# Patient Record
Sex: MALE | Race: WHITE | NOT HISPANIC OR LATINO | Employment: FULL TIME | URBAN - METROPOLITAN AREA
[De-identification: names, ages, dates, MRNs, and addresses within clinical notes are randomized per-mention and may not be internally consistent; named-entity substitution may affect disease eponyms.]

---

## 2023-08-30 PROBLEM — K21.9 GASTROESOPHAGEAL REFLUX DISEASE: Status: ACTIVE | Noted: 2023-08-30

## 2023-08-30 PROBLEM — R30.0 DYSURIA: Status: ACTIVE | Noted: 2023-08-30

## 2023-08-30 PROBLEM — H90.42 SENSORINEURAL HEARING LOSS, UNILATERAL, LEFT EAR, WITH UNRESTRICTED HEARING ON THE CONTRALATERAL SIDE: Status: ACTIVE | Noted: 2023-08-30

## 2023-08-30 PROBLEM — H69.90 EUSTACHIAN TUBE DYSFUNCTION: Status: ACTIVE | Noted: 2023-08-30

## 2023-08-30 PROBLEM — H93.8X2 SENSATION OF FULLNESS IN LEFT EAR: Status: ACTIVE | Noted: 2023-08-30

## 2023-08-30 PROBLEM — H81.02 COCHLEAR HYDROPS OF LEFT EAR: Status: ACTIVE | Noted: 2023-08-30

## 2023-10-06 ENCOUNTER — CLINICAL SUPPORT (OUTPATIENT)
Dept: AUDIOLOGY | Facility: CLINIC | Age: 43
End: 2023-10-06
Payer: COMMERCIAL

## 2023-10-06 ENCOUNTER — OFFICE VISIT (OUTPATIENT)
Dept: OTOLARYNGOLOGY | Facility: CLINIC | Age: 43
End: 2023-10-06
Payer: COMMERCIAL

## 2023-10-06 VITALS — TEMPERATURE: 97.8 F | WEIGHT: 151 LBS | HEIGHT: 66 IN | BODY MASS INDEX: 24.27 KG/M2

## 2023-10-06 DIAGNOSIS — H93.8X2 SENSATION OF FULLNESS IN LEFT EAR: ICD-10-CM

## 2023-10-06 DIAGNOSIS — H81.02 MENIERE DISEASE, LEFT: ICD-10-CM

## 2023-10-06 DIAGNOSIS — H90.42 SENSORINEURAL HEARING LOSS, UNILATERAL, LEFT EAR, WITH UNRESTRICTED HEARING ON THE CONTRALATERAL SIDE: ICD-10-CM

## 2023-10-06 DIAGNOSIS — H90.42 SENSORINEURAL HEARING LOSS, UNILATERAL, LEFT EAR, WITH UNRESTRICTED HEARING ON THE CONTRALATERAL SIDE: Primary | ICD-10-CM

## 2023-10-06 DIAGNOSIS — H81.02 COCHLEAR HYDROPS OF LEFT EAR: Primary | ICD-10-CM

## 2023-10-06 PROCEDURE — 1036F TOBACCO NON-USER: CPT | Performed by: OTOLARYNGOLOGY

## 2023-10-06 PROCEDURE — 92550 TYMPANOMETRY & REFLEX THRESH: CPT

## 2023-10-06 PROCEDURE — 99214 OFFICE O/P EST MOD 30 MIN: CPT | Performed by: OTOLARYNGOLOGY

## 2023-10-06 PROCEDURE — 92557 COMPREHENSIVE HEARING TEST: CPT

## 2023-10-06 ASSESSMENT — PAIN SCALES - GENERAL: PAINLEVEL_OUTOF10: 0 - NO PAIN

## 2023-10-06 NOTE — PROGRESS NOTES
AUDIOLOGIC EVALUATION  Name: Alec Swan  YOB: 1980  Age: 43 y.o.    Date of Evaluation:  10/6/2023    History:   Alec Swan, age 43 years, was seen for an add-on hearing evaluation during a conjunction visit with James Luna MD. He reported a sudden hearing loss in the left ear approximately 20 years ago. He noted a history of aural fullness and tinnitus. He indicated that he experiences episodic vertigo. His most recent episode was 1.5 weeks prior to today's appointment and lasted for approximately 1 hour.    Previous audiologic evaluation on 10/12/2020 from an outside clinic revealed normal hearing in the right ear and a moderately-severe rising  mild sensorineural hearing loss in the left ear.    Evaluation:    Otoscopy  Clear ear canals bilaterally.    Tympanometry  Right ear:Type A tympanogram, normal ear canal volume and compliance  Left ear: Type A tympanogram, normal ear canal volume and compliance     Acoustic Reflexes  Right ear: Ipsilateral acoustic reflexes present at 500 Hz - 2000 Hz (no response at 4000 Hz). Contralateral (probe right, stimulus left) acoustic reflexes present at 500 Hz - 1000 Hz (no response at 2000 - 4000 Hz). Contralateral acoustic reflex decay could not be tested due to equipment limits.   Left ear: Ipsilateral acoustic reflexes present at 500 Hz - 1000 Hz (no response at 2000 - 4000 Hz). Contralateral (probe left, stimulus right) acoustic reflexes were absent 500 Hz - 4000 Hz. Contralateral acoustic reflex decay could not be tested due to equipment limits.     Audiometric Evaluation  Right ear: hearing sensitivity within normal limits. Word recognition ability estimated to be excellent (96%) at 45 dB HL based on an NU-6 recorded 25-word list.  Left ear: moderate rising to mild sensorineural hearing loss. Word recognition ability estimated to be good (84%) at 75 dB HL based on an NU-6 recorded 25-word list.    The test results were discussed with the patient and  they were returned to James Luna MD for completion of the office visit.    Impressions  Today's evaluation revealed normal hearing in the right ear and a moderate rising to mild sensorineural hearing loss in the left ear. Word recognition abilities were measured to be excellent in the right ear and good in the left ear. Tympanograms were consistent with normal middle ear function.    Recommendations  1) Continue medical follow-up with James Luna MD   2) Consider a hearing aid in the left ear.    Time: 9476-0278    TRACI Villegas, CCC-A  Licensed Audiologist

## 2023-10-06 NOTE — PROGRESS NOTES
History of present illness:  Alec Swan is a 43 y.o. male presenting today for a follow-up visit.  At his last appointment on February 3, 2023 he presented with left ear fullness and was diagnosed with probable cochlear hydrops and was started on betahistine.  He has since weaned off the betahistine but has had 2 episodes of vertiginous dizziness the last 1 being a week and a half ago lasting over an hour.  This was associated with muffled and fluctuating hearing loss in the left ear.  He is here today visit his treatment options.  He had questions about hearing amplification and medications.    The patient's current medications, active allergies and list of medical problems were reviewed in the EHR and confirmed electronically there are as follows;  Allergies:   No Known Allergies  Current list of medications:   No current outpatient medications on file.     No current facility-administered medications for this visit.     Problem list:  Patient Active Problem List   Diagnosis    Cochlear hydrops of left ear    Dysuria    Eustachian tube dysfunction    Gastroesophageal reflux disease    Inflamed seborrheic keratosis    Other specified dermatitis    Sensation of fullness in left ear    Sensorineural hearing loss, unilateral, left ear, with unrestricted hearing on the contralateral side         Physical Examination:  CONSTITUTIONAL:  No acute distress  VOICE:  No hoarseness or other abnormality  RESPIRATION:  Breathing comfortably, no stridor  CV:  No clubbing/cyanosis/edema in hands  EYES:  EOM intact, sclera clear  NEURO:  Alert and oriented times 3, Cranial nerves II-XII grossly intact and symmetric bilaterally  HEAD AND FACE:  Symmetric facial features, no masses or lesions  RIGHT EAR:  Normal external ear and post auricular area, no visible lesions, external auditory canal patent, tympanic membrane intact, no retraction, no signs of mass, effusion, or infection within the middle ear  LEFT EAR: Normal external  ear and post auricular area, no visible lesions, external auditory canal patent, tympanic membrane intact, no retraction, no signs of mass, effusion, or infection within the middle ear  NOSE:  Anterior rhinoscopy clear, no significant external deformity.  ORAL CAVITY/OROPHARYNX/LIPS:  normal lining, mobile tongue.  PHARYNGEAL WALLS: Moist mucosal lining, good palatal elevation  NECK/LYMPH:  No LAD, no thyroid masses, trachea midline  SKIN:  Neck and facial skin is without scar or injury  PSYCH:  Alert and oriented with appropriate mood and affect    Diagnostic testing:    Audiometry:  Today's audiogram completed showed on the right normal hearing sensitivity with excellent speech discrimination on the left he had a moderate to mild sensorineural hearing loss speech discrimination was 84%.  Type a tympanogram.    I personally reviewed the available patient's external record and independently reviewed their audiometric testing [and] radiographic imaging through the appropriate viewing software as detailed in my note and agree with the detailed report.    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  He may have probable Ménière's disease in the left ear.  We reviewed the dietary changes with low-sodium diet, lifestyle modification and taking betahistine 16 mg 3 times daily.  He would like to think about the possibility of hearing amplification.  He will return to see me in 6 to 8 months for follow-up    I discussed with the patient the complexity of my medical decision making including the treatment and testing rational, indications of their elective procedure and possible adverse effects and/or complications. Based on the provided documentation and my professional assessment of this patient's chronic condition with acute  exacerbation, the complexity of evaluation and treatment is moderate.    This note was created using speech recognition transcription software. Despite proofreading, several typographical errors might be present that might affect the meaning of the content. Please call with any questions.

## 2024-06-04 ENCOUNTER — HOSPITAL ENCOUNTER (OUTPATIENT)
Dept: RADIOLOGY | Facility: CLINIC | Age: 44
Discharge: HOME | End: 2024-06-04
Payer: COMMERCIAL

## 2024-06-04 ENCOUNTER — OFFICE VISIT (OUTPATIENT)
Dept: ORTHOPEDIC SURGERY | Facility: CLINIC | Age: 44
End: 2024-06-04
Payer: COMMERCIAL

## 2024-06-04 DIAGNOSIS — S62.101A FX WRIST, RIGHT, CLOSED, INITIAL ENCOUNTER: ICD-10-CM

## 2024-06-04 DIAGNOSIS — S52.501A NONDISPLACED FRACTURE OF DISTAL END OF RIGHT RADIUS: Primary | ICD-10-CM

## 2024-06-04 DIAGNOSIS — M25.531 WRIST PAIN, RIGHT: ICD-10-CM

## 2024-06-04 PROCEDURE — 99204 OFFICE O/P NEW MOD 45 MIN: CPT | Performed by: FAMILY MEDICINE

## 2024-06-04 PROCEDURE — 1036F TOBACCO NON-USER: CPT | Performed by: FAMILY MEDICINE

## 2024-06-04 PROCEDURE — L3984 UPPER EXT FX ORTHOSIS WRIST: HCPCS | Performed by: FAMILY MEDICINE

## 2024-06-04 PROCEDURE — 73110 X-RAY EXAM OF WRIST: CPT | Mod: RT

## 2024-06-04 PROCEDURE — 73110 X-RAY EXAM OF WRIST: CPT | Mod: RIGHT SIDE | Performed by: RADIOLOGY

## 2024-06-04 ASSESSMENT — PAIN DESCRIPTION - DESCRIPTORS: DESCRIPTORS: ACHING;SHARP

## 2024-06-04 ASSESSMENT — PAIN - FUNCTIONAL ASSESSMENT: PAIN_FUNCTIONAL_ASSESSMENT: 0-10

## 2024-06-04 ASSESSMENT — PAIN SCALES - GENERAL: PAINLEVEL_OUTOF10: 5 - MODERATE PAIN

## 2024-06-04 NOTE — PROGRESS NOTES
NPV R wrist fx    History of Present Illness:  Encounter date: 06/04/2024  Alec Swan is a 44 y.o. 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 He injured his right wrist on 6/1/2024, was loading his tractor onto his truck, then the tractor began to fall, he jumped out of the way, and landed onto outstretched right hand. He had immediate pain and subsequent swelling, then went to  that day where he had XR's which revealed nondisplaced articular distal radius fracture. He was placed in a cock-up wrist brace and advised to follow-up with ortho. He has been wearing the brace since then and states his swelling has gone down with some improvement of pain, though ROM is still very limited in the wrist and still has tenderness. Denies any weakness, numbness, tingling, discoloration, or radiation of pain. Denies any previous injury to his right wrist/hand. Denies any other concerns at this time.    Review of Systems  Constitutional: no fever, no chills, not feeling tired, no recent weight gain and no recent weight loss.   ENT: no nosebleeds.   Cardiovascular: no chest pain.   Respiratory: no shortness of breath and no cough.   Gastrointestinal: no abdominal pain, no nausea, no diarrhea and no vomiting.   Musculoskeletal: as noted in HPI and no arthralgias.   Integumentary: no rashes and no skin wound.   Neurological: no headache.   Psychiatric: no sleep disturbances and no depression.   Endocrine: no muscle weakness and no muscle cramps.   Hematologic/Lymphatic: no swollen glands and no tendency for easy bruising.     Physical Exam:  Examination of the right wrist and hand: Wrist range of motion is limited and painful. There is soft tissue swelling over dorsal aspect with mild ecchymosis. No thenar or hypothenar atrophy. There is tenderness with palpation of the distal radius. There is no tenderness with palpation of the TFCC region, ECU tendon, 1st CMC joint or  STT joint. No tenderness with palpation of the scaphoid.  strength slightly reduced due to pain/swelling. Normal sensation. Pain with active and passive wrist ROM in all planes.    Constitutional: General appearance = Alert and in no acute distress. Well developed, well nourished.   Head and face: Normal.    Eyes: External Eye, Conjunctiva and Lids=Normal external exam; extraocular movements intact (EOMI).   Ears, Nose, Mouth, and Throat: External inspection of ears and nose=Normal.   Hearing= Normal.    Neck: No neck mass was observed. Supple.   Pulmonary: Respiratory effort = No respiratory distress.   Cardiovascular: Examination of extremities= No peripheral edema.   Skin and subcutaneous tissue: Normal skin color and pigmentation, normal skin turgor, and no rash; palpation of skin and subcutaneous tissue=Normal.    Neurologic: Sensation= Normal.    Psychiatric: Judgment and insight= Intact.  Orientation to person, place, and time: Alert and oriented x 3.  Mood and affect= Normal.     Imaging:     Radiographs of the right wrist obtained today were reviewed and revealed transverse nondisplaced distal radial fracture with possible minimal vertical intra-articular extension.  The studies were reviewed with Dr. Hui personally in the office today.    Problem List Items Addressed This Visit    None  Visit Diagnoses         Codes    Nondisplaced fracture of distal end of right radius    -  Primary S52.501A    Fx wrist, right, closed, initial encounter     S62.101A    Relevant Orders    Exos Short Arm Fx Brace          Patient Discussion/Summary  We reviewed the exam and x-ray findings and discussed the conservative and surgical treatment options. We agreed to conservative management with immobilization in short arm Exos, provided today. He should remain in Exos for the next 4 weeks, may remove for hygiene. Recommended prescription doses of anti-inflammatories as needed such as ibuprofen 600 mg up to three times  daily with food. Plan for follow-up in 4 weeks for re-evaluation and repeat radiographs. He may follow-up sooner if any new concerns arise.    Patient was prescribed a right short arm Exos fracture brace for right distal radius fracture. The patient has weakness, instability and/or deformity of their right wrist which requires stabilization from this orthosis to improve their function.      Verbal and written instructions for the use, wear schedule, cleaning and application of this item were given.  Patient was instructed that should the brace result in increased pain, decreased sensation, increased swelling, or an overall worsening of their medical condition, to please contact our office immediately.     Orthotic management and training was provided for skin care, modifications due to healing tissues, edema changes, interruption in skin integrity, and safety precautions with the orthosis.     **This note was dictated using Dragon speech recognition software and was not corrected for spelling or grammatical errors**     Harsh Hui M.D.  Clinical , Division of Sports Medicine  Primary Care Sports Medicine  Department of Orthopedic Surgery  [facility] [place]

## 2024-06-25 ENCOUNTER — HOSPITAL ENCOUNTER (OUTPATIENT)
Dept: RADIOLOGY | Facility: CLINIC | Age: 44
Discharge: HOME | End: 2024-06-25
Payer: COMMERCIAL

## 2024-06-25 ENCOUNTER — APPOINTMENT (OUTPATIENT)
Dept: ORTHOPEDIC SURGERY | Facility: CLINIC | Age: 44
End: 2024-06-25
Payer: COMMERCIAL

## 2024-06-25 DIAGNOSIS — S52.501A NONDISPLACED FRACTURE OF DISTAL END OF RIGHT RADIUS: Primary | ICD-10-CM

## 2024-06-25 DIAGNOSIS — S52.501A NONDISPLACED FRACTURE OF DISTAL END OF RIGHT RADIUS: ICD-10-CM

## 2024-06-25 PROCEDURE — 73110 X-RAY EXAM OF WRIST: CPT | Mod: RT

## 2024-06-25 PROCEDURE — 73110 X-RAY EXAM OF WRIST: CPT | Mod: RIGHT SIDE | Performed by: RADIOLOGY

## 2024-06-25 PROCEDURE — 99213 OFFICE O/P EST LOW 20 MIN: CPT | Performed by: FAMILY MEDICINE

## 2024-06-25 PROCEDURE — 1036F TOBACCO NON-USER: CPT | Performed by: FAMILY MEDICINE

## 2024-06-25 ASSESSMENT — PAIN SCALES - GENERAL: PAINLEVEL_OUTOF10: 2

## 2024-06-25 ASSESSMENT — PAIN DESCRIPTION - DESCRIPTORS: DESCRIPTORS: DISCOMFORT

## 2024-06-25 ASSESSMENT — PAIN - FUNCTIONAL ASSESSMENT: PAIN_FUNCTIONAL_ASSESSMENT: 0-10

## 2024-06-25 NOTE — PROGRESS NOTES
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 He injured his right wrist on 6/1/2024, was loading his tractor onto his truck, then the tractor began to fall, he jumped out of the way, and landed onto outstretched right hand. He had immediate pain and subsequent swelling, then went to  that day where he had XR's which revealed nondisplaced articular distal radius fracture. He was placed in a cock-up wrist brace and advised to follow-up with ortho. He has been wearing the brace since then and states his swelling has gone down with some improvement of pain, though ROM is still very limited in the wrist and still has tenderness. Denies any weakness, numbness, tingling, discoloration, or radiation of pain. Denies any previous injury to his right wrist/hand. Denies any other concerns at this time.    6/25/2024: Alec returns for follow-up of his left distal radius fracture.  He states is feeling better.  He has no pain.  He has been compliant with the fracture brace.  It does feel little bit stiff after removing the brace.  He denies any new injury or trauma to the wrist.    Review of Systems  Constitutional: no fever, no chills, not feeling tired, no recent weight gain and no recent weight loss.   ENT: no nosebleeds.   Cardiovascular: no chest pain.   Respiratory: no shortness of breath and no cough.   Gastrointestinal: no abdominal pain, no nausea, no diarrhea and no vomiting.   Musculoskeletal: as noted in HPI and no arthralgias.   Integumentary: no rashes and no skin wound.   Neurological: no headache.   Psychiatric: no sleep disturbances and no depression.   Endocrine: no muscle weakness and no muscle cramps.   Hematologic/Lymphatic: no swollen glands and no tendency for easy bruising.     Physical Exam:  Examination of the right wrist and hand: Wrist range of  motion is limited and only mildly painful. There is no soft tissue swelling over dorsal aspect with resolved ecchymosis. No thenar or hypothenar atrophy. There is no significant tenderness with palpation of the distal radius. There is no tenderness with palpation of the TFCC region, ECU tendon, 1st CMC joint or STT joint. No tenderness with palpation of the scaphoid.  strength slightly reduced. Normal sensation.  No significant pain with active and passive wrist ROM in all planes, but does not have full range of motion yet.  Improved exam from last visit.    Imaging:     Radiographs of the right wrist obtained today were reviewed and revealed transverse fracture of the distal radius that looks a bit more displaced than previous films.    Problem List Items Addressed This Visit    None  Visit Diagnoses         Codes    Nondisplaced fracture of distal end of right radius    -  Primary S52.501A    Relevant Orders    XR wrist right 3+ views    Referral to Orthopaedic Surgery          Patient Discussion/Summary  We reviewed the exam and x-ray findings and discussed the conservative and surgical treatment options.  We are going to send him to one of our hand surgeons as the displacement of the intra-articular fracture appears to be a bit worse on x-ray today.  He is to stay in the Exos brace until seen by hand surgery.  If the option is to continue with conservative care, we will be happy to see him in follow-up otherwise he can follow-up as needed.  All of his questions were answered and he agrees with treatment plan.    **This note was dictated using Dragon speech recognition software and was not corrected for spelling or grammatical errors**     Harsh Hui M.D.  Clinical , Division of Sports Medicine  Primary Care Sports Medicine  Department of Orthopedic Surgery  [facility]